# Patient Record
Sex: FEMALE | Race: OTHER | HISPANIC OR LATINO | Employment: FULL TIME | URBAN - METROPOLITAN AREA
[De-identification: names, ages, dates, MRNs, and addresses within clinical notes are randomized per-mention and may not be internally consistent; named-entity substitution may affect disease eponyms.]

---

## 2024-02-24 ENCOUNTER — HOSPITAL ENCOUNTER (OUTPATIENT)
Facility: MEDICAL CENTER | Age: 39
End: 2024-02-24
Payer: COMMERCIAL

## 2024-02-24 ENCOUNTER — OFFICE VISIT (OUTPATIENT)
Dept: URGENT CARE | Facility: CLINIC | Age: 39
End: 2024-02-24
Payer: COMMERCIAL

## 2024-02-24 VITALS
OXYGEN SATURATION: 97 % | SYSTOLIC BLOOD PRESSURE: 110 MMHG | BODY MASS INDEX: 37.08 KG/M2 | DIASTOLIC BLOOD PRESSURE: 72 MMHG | RESPIRATION RATE: 14 BRPM | WEIGHT: 160.2 LBS | HEART RATE: 86 BPM | HEIGHT: 55 IN | TEMPERATURE: 97.7 F

## 2024-02-24 DIAGNOSIS — R30.0 DYSURIA: Primary | ICD-10-CM

## 2024-02-24 DIAGNOSIS — N30.01 ACUTE CYSTITIS WITH HEMATURIA: ICD-10-CM

## 2024-02-24 LAB
APPEARANCE UR: CLEAR
BILIRUB UR STRIP-MCNC: NEGATIVE MG/DL
COLOR UR AUTO: NORMAL
GLUCOSE UR STRIP.AUTO-MCNC: NEGATIVE MG/DL
KETONES UR STRIP.AUTO-MCNC: NEGATIVE MG/DL
LEUKOCYTE ESTERASE UR QL STRIP.AUTO: NORMAL
NITRITE UR QL STRIP.AUTO: NEGATIVE
PH UR STRIP.AUTO: 7 [PH] (ref 5–8)
POCT INT CON NEG: NEGATIVE
POCT INT CON POS: POSITIVE
POCT URINE PREGNANCY TEST: NEGATIVE
PROT UR QL STRIP: NEGATIVE MG/DL
RBC UR QL AUTO: NORMAL
SP GR UR STRIP.AUTO: 1.01
UROBILINOGEN UR STRIP-MCNC: 0.2 MG/DL

## 2024-02-24 PROCEDURE — 87510 GARDNER VAG DNA DIR PROBE: CPT

## 2024-02-24 PROCEDURE — 87660 TRICHOMONAS VAGIN DIR PROBE: CPT

## 2024-02-24 PROCEDURE — 87480 CANDIDA DNA DIR PROBE: CPT

## 2024-02-24 PROCEDURE — 3074F SYST BP LT 130 MM HG: CPT

## 2024-02-24 PROCEDURE — 81002 URINALYSIS NONAUTO W/O SCOPE: CPT

## 2024-02-24 PROCEDURE — 87086 URINE CULTURE/COLONY COUNT: CPT

## 2024-02-24 PROCEDURE — 3078F DIAST BP <80 MM HG: CPT

## 2024-02-24 PROCEDURE — 81025 URINE PREGNANCY TEST: CPT

## 2024-02-24 PROCEDURE — 99213 OFFICE O/P EST LOW 20 MIN: CPT

## 2024-02-24 RX ORDER — NITROFURANTOIN 25; 75 MG/1; MG/1
100 CAPSULE ORAL 2 TIMES DAILY
Qty: 10 CAPSULE | Refills: 0 | Status: SHIPPED | OUTPATIENT
Start: 2024-02-24 | End: 2024-02-29

## 2024-02-24 RX ORDER — PHENAZOPYRIDINE HYDROCHLORIDE 200 MG/1
TABLET, FILM COATED ORAL
Qty: 6 TABLET | Refills: 0 | Status: SHIPPED | OUTPATIENT
Start: 2024-02-24

## 2024-02-24 ASSESSMENT — FIBROSIS 4 INDEX: FIB4 SCORE: 0.69

## 2024-02-24 NOTE — PROGRESS NOTES
Subjective:   Letitia Aaron is a 38 y.o. female who presents for UTI (Cloudy urine, itch, L pelvic pain x Tuesday )          I introduced myself to the patient and informed them that I am a family nurse practitioner.    HPI:Letitia is a 38-year-old female comes in today c/o dysuria, urgency, frequency, cloudy urine, vaginal itching, mild pelvic pain. Onset was 5 days ago.  Patient describes symptoms as constant, worsening. They describe the pain as burning when she passes urine, aching in her lower abdomen. Aggravating factors include none. Relieving factors include none. Treatments tried at home include none. They describe their symptoms as moderate. She denies any vaginal odor or discharge.      Review of Systems   Constitutional:  Negative for chills, fever and malaise/fatigue.   HENT:  Negative for congestion, ear pain and sore throat.    Eyes:  Negative for pain, discharge and redness.   Respiratory:  Negative for cough, sputum production, shortness of breath, wheezing and stridor.    Cardiovascular:  Negative for chest pain and palpitations.   Gastrointestinal:  Positive for abdominal pain. Negative for diarrhea, nausea and vomiting.   Genitourinary:  Positive for dysuria, frequency and urgency. Negative for flank pain and hematuria.   Musculoskeletal:  Negative for myalgias.   Skin:  Negative for rash.   Neurological:  Negative for dizziness and headaches.       Medications: This patient does not have an active medication from one of the medication groupers.     Allergies: Patient has no known allergies.    Problem List: does not have a problem list on file.    Surgical History:  No past surgical history on file.    Past Social Hx:        Past Family Hx:   family history is not on file.     Problem list, medications, and allergies reviewed by myself today in Epic.   I have documented what I find to be significant in regards to past medical, social, family and surgical history  in my HPI or under PMH/PSH/  "review section, otherwise it is noncontributory     Objective:     /72 (BP Location: Left arm, Patient Position: Sitting, BP Cuff Size: Adult)   Pulse 86   Temp 36.5 °C (97.7 °F) (Temporal)   Resp 14   Ht 1.245 m (4' 1\")   Wt 72.7 kg (160 lb 3.2 oz)   SpO2 97%   BMI 46.91 kg/m²     During this visit, appropriate PPE was worn, and hand hygiene was performed.    Physical Exam  Vitals reviewed.   Constitutional:       General: She is not in acute distress.     Appearance: Normal appearance. She is normal weight. She is not ill-appearing or toxic-appearing.   HENT:      Head: Normocephalic and atraumatic.      Right Ear: External ear normal.      Left Ear: External ear normal.      Nose: No congestion or rhinorrhea.   Eyes:      General: No scleral icterus.        Right eye: No discharge.         Left eye: No discharge.      Extraocular Movements: Extraocular movements intact.      Conjunctiva/sclera: Conjunctivae normal.   Cardiovascular:      Rate and Rhythm: Normal rate.   Pulmonary:      Effort: Pulmonary effort is normal.   Abdominal:      General: There is no distension.      Palpations: There is no hepatomegaly, splenomegaly or mass.      Tenderness: There is abdominal tenderness in the suprapubic area. There is no right CVA tenderness, left CVA tenderness, guarding or rebound. Negative signs include Flores's sign, Rovsing's sign and McBurney's sign.      Hernia: No hernia is present.       Genitourinary:     Comments: Deferred  Musculoskeletal:         General: No swelling or tenderness. Normal range of motion.      Right lower leg: No edema.      Left lower leg: No edema.   Skin:     General: Skin is warm and dry.   Neurological:      General: No focal deficit present.      Mental Status: She is alert and oriented to person, place, and time. Mental status is at baseline.   Psychiatric:         Mood and Affect: Mood normal.         Behavior: Behavior normal.         Lab Results/POC Test Results "   Results for orders placed or performed in visit on 02/24/24   POCT Urinalysis   Result Value Ref Range    POC Color light yellow Negative    POC Appearance clear Negative    POC Glucose negative Negative mg/dL    POC Bilirubin negative Negative mg/dL    POC Ketones negative Negative mg/dL    POC Specific Gravity 1.010 <1.005 - >1.030    POC Blood Trace Negative    POC Urine PH 7.0 5.0 - 8.0    POC Protein negative Negative mg/dL    POC Urobiligen 0.2 Negative (0.2) mg/dL    POC Nitrites negative Negative    POC Leukocyte Esterase small Negative   POCT Pregnancy   Result Value Ref Range    POC Urine Pregnancy Test Negative     Internal Control Positive Positive     Internal Control Negative Negative            Assessment/Plan:     Diagnosis and associated orders:     1. Dysuria  POCT Urinalysis    POCT Pregnancy    URINE CULTURE(NEW)    VAGINAL PATHOGENS DNA PANEL    phenazopyridine (PYRIDIUM) 200 MG Tab      2. Acute cystitis with hematuria  nitrofurantoin (MACROBID) 100 MG Cap    phenazopyridine (PYRIDIUM) 200 MG Tab         Comments/MDM:     1. Acute cystitis with hematuria  I discussed with patient that their presentation and symptoms are consistent with UTI.  I discussed diagnosis and possible differentials with the patient, patient was instructed regarding following -  maintain adequate po hydration.  UTI prevention measures including meticulous attention to hygiene, bathe after sexual intercourse, wipe from front to back, wear cotton underwear.  OTC azo prn urinary pain.  I did print out some information on UTI and on Pyridium and Macrobid for the patient and I did go over these with them.  To ER if  any worsening abdominal or flank pain, cliff blood or pus in the urine, fever, chills, nausea and vomiting, lethargy, inability to tolerate fluids, dehydration.     I discussed with patient that I will get a urine culture to guide antibiotic therapy, culture results take about 3 days, I will notify them of the  results via [employer] and we will modify the antibiotic therapy if indicated.  Return to an urgent care or ER in 3-5 days if symptoms persist, sooner if worse.  They state they have good understanding of all instructions and is agreeable to plan of care    - nitrofurantoin (MACROBID) 100 MG Cap; Take 1 Capsule by mouth 2 times a day for 5 days.  Dispense: 10 Capsule; Refill: 0  - phenazopyridine (PYRIDIUM) 200 MG Tab; Take 1 tab by mouth up to three times per day only if needed for bladder or urinary pain.  Will turn urine orange.  Dispense: 6 Tablet; Refill: 0    2. Dysuria  Discussed with patient she does have a trace of blood and some leuks in her urine, no nitrites, we will have her do a vaginal pathogen self swab to rule out other pathogens that could be mimicking symptoms of UTI.  I will let her know of the results via [employer], and treat if indicated.  She states she understands and is agreeable  - POCT Urinalysis  - POCT Pregnancy  - URINE CULTURE(NEW); Future  - VAGINAL PATHOGENS DNA PANEL; Future  - phenazopyridine (PYRIDIUM) 200 MG Tab; Take 1 tab by mouth up to three times per day only if needed for bladder or urinary pain.  Will turn urine orange.  Dispense: 6 Tablet; Refill: 0         Pt is clinically stable at today's acute urgent care visit. Vital signs are normal and reassuring.  No acute distress noted. Appropriate for outpatient management at this time.        I personally reviewed prior external notes and test results pertinent to today's visit.  I have independently reviewed and interpreted all diagnostics ordered during this urgent care acute visit.        Please note that this dictation was created using voice recognition software. I have made a reasonable attempt to correct obvious errors, but I expect that there are errors of grammar and possibly content that I did not discover before finalizing the note.    This note was electronically signed by Brian SAUCEDA, RICHY, RIVERA, ROSEMARY

## 2024-02-25 DIAGNOSIS — R30.0 DYSURIA: ICD-10-CM

## 2024-02-25 LAB
CANDIDA DNA VAG QL PROBE+SIG AMP: NEGATIVE
G VAGINALIS DNA VAG QL PROBE+SIG AMP: NEGATIVE
T VAGINALIS DNA VAG QL PROBE+SIG AMP: NEGATIVE

## 2024-02-25 ASSESSMENT — ENCOUNTER SYMPTOMS
MYALGIAS: 0
FLANK PAIN: 0
EYE DISCHARGE: 0
SPUTUM PRODUCTION: 0
SHORTNESS OF BREATH: 0
FEVER: 0
CHILLS: 0
SORE THROAT: 0
PALPITATIONS: 0
DIARRHEA: 0
DIZZINESS: 0
STRIDOR: 0
NAUSEA: 0
VOMITING: 0
COUGH: 0
EYE PAIN: 0
ABDOMINAL PAIN: 1
EYE REDNESS: 0
WHEEZING: 0
HEADACHES: 0

## 2024-02-27 LAB
BACTERIA UR CULT: NORMAL
SIGNIFICANT IND 70042: NORMAL
SITE SITE: NORMAL
SOURCE SOURCE: NORMAL

## 2024-09-22 ENCOUNTER — OFFICE VISIT (OUTPATIENT)
Dept: URGENT CARE | Facility: CLINIC | Age: 39
End: 2024-09-22

## 2024-09-22 VITALS
DIASTOLIC BLOOD PRESSURE: 78 MMHG | OXYGEN SATURATION: 96 % | BODY MASS INDEX: 29.36 KG/M2 | TEMPERATURE: 97.4 F | WEIGHT: 155.5 LBS | RESPIRATION RATE: 14 BRPM | HEART RATE: 108 BPM | HEIGHT: 61 IN | SYSTOLIC BLOOD PRESSURE: 120 MMHG

## 2024-09-22 DIAGNOSIS — R51.9 ACUTE NONINTRACTABLE HEADACHE, UNSPECIFIED HEADACHE TYPE: ICD-10-CM

## 2024-09-22 DIAGNOSIS — R50.9 FEVER, UNSPECIFIED FEVER CAUSE: ICD-10-CM

## 2024-09-22 PROCEDURE — 3078F DIAST BP <80 MM HG: CPT | Performed by: NURSE PRACTITIONER

## 2024-09-22 PROCEDURE — 99213 OFFICE O/P EST LOW 20 MIN: CPT | Performed by: NURSE PRACTITIONER

## 2024-09-22 PROCEDURE — 3074F SYST BP LT 130 MM HG: CPT | Performed by: NURSE PRACTITIONER

## 2024-09-22 ASSESSMENT — ENCOUNTER SYMPTOMS
COUGH: 0
DIZZINESS: 0
DOUBLE VISION: 0
SORE THROAT: 0
HEADACHES: 1
BLURRED VISION: 0
NECK PAIN: 0
BACK PAIN: 1
ABDOMINAL PAIN: 0
FEVER: 1

## 2024-09-22 ASSESSMENT — FIBROSIS 4 INDEX: FIB4 SCORE: 0.71

## 2024-09-22 NOTE — PATIENT INSTRUCTIONS
Symptomatic care.  -Oral hydration and rest.   -Cough control: nonpharmacologic options for cough relief such as throat lozenges, hot tea, honey.  -Over the counter expectorant as directed; Guaifenesin (Mucinex).  -Tylenol or ibuprofen for pain and fever as directed.   -Warm salt water gargles.  -OTC Throat lozenges or spray (Cepacol).    Seek emergency medical care immediately for: Severe uncontrolled headache, stiff neck, confusion, trouble breathing, inability to wake or stay awake, bluish lips or face, persistent tachycardia (fast heart rate), prolonged dizziness, persistent high grade fevers. Follow up for prolonged cough, persistent wheezing, persistent throat pain, difficulty swallowing, persistent fevers, leg swelling, or any other concerns. Follow up with your Primary Care Provider.

## 2024-09-22 NOTE — PROGRESS NOTES
Subjective:     Letitia Aaron is a 39 y.o. female who presents for Headache (Fever, high temp of 101 x Friday )      Symptoms started on Friday. Had a negative COVID test today. Has had persistent temporal headache, described as pressure. Took Tylenol and motrin. No headache now, stating a cold patch helped. Headache was a gradual onset.     Fever   This is a new problem. The current episode started in the past 7 days. Associated symptoms include headaches. Pertinent negatives include no abdominal pain, coughing, rash, sore throat or urinary pain.       History reviewed. No pertinent past medical history.    History reviewed. No pertinent surgical history.    Social History     Socioeconomic History    Marital status:      Spouse name: Not on file    Number of children: Not on file    Years of education: Not on file    Highest education level: Not on file   Occupational History    Not on file   Tobacco Use    Smoking status: Not on file    Smokeless tobacco: Not on file   Substance and Sexual Activity    Alcohol use: Not on file    Drug use: Not on file    Sexual activity: Not on file   Other Topics Concern    Not on file   Social History Narrative    Not on file     Social Determinants of Health     Financial Resource Strain: Not on file   Food Insecurity: Not on file   Transportation Needs: Not on file   Physical Activity: Not on file   Stress: Not on file   Social Connections: Not on file   Intimate Partner Violence: Not on file   Housing Stability: Not on file        History reviewed. No pertinent family history.     No Known Allergies    Review of Systems   Constitutional:  Positive for fever.   HENT:  Negative for sore throat.    Eyes:  Negative for blurred vision and double vision.   Respiratory:  Negative for cough.    Gastrointestinal:  Negative for abdominal pain.   Genitourinary:  Negative for dysuria, flank pain and hematuria.   Musculoskeletal:  Positive for back pain. Negative for neck  "pain.   Skin:  Negative for rash.   Neurological:  Positive for headaches. Negative for dizziness.   All other systems reviewed and are negative.       Objective:   /78 (BP Location: Left arm, Patient Position: Sitting, BP Cuff Size: Adult long)   Pulse (!) 108   Temp 36.3 °C (97.4 °F) (Temporal)   Resp 14   Ht 1.549 m (5' 1\")   Wt 70.5 kg (155 lb 8 oz)   SpO2 96%   BMI 29.38 kg/m²     Physical Exam  Vitals reviewed.   Constitutional:       General: She is not in acute distress.     Appearance: She is well-developed. She is not toxic-appearing.   HENT:      Head: Normocephalic and atraumatic.      Right Ear: External ear normal.      Left Ear: External ear normal.      Nose: Nose normal.   Eyes:      Extraocular Movements: Extraocular movements intact.      Conjunctiva/sclera: Conjunctivae normal.      Pupils: Pupils are equal, round, and reactive to light.   Neck:      Meningeal: Brudzinski's sign and Kernig's sign absent.   Cardiovascular:      Rate and Rhythm: Normal rate.   Pulmonary:      Effort: Pulmonary effort is normal. No respiratory distress.   Musculoskeletal:         General: Normal range of motion.      Cervical back: Full passive range of motion without pain and neck supple.   Skin:     General: Skin is warm and dry.      Findings: No rash.   Neurological:      Mental Status: She is alert and oriented to person, place, and time.      GCS: GCS eye subscore is 4. GCS verbal subscore is 5. GCS motor subscore is 6.      Cranial Nerves: No facial asymmetry.      Motor: No weakness.      Gait: Gait is intact.   Psychiatric:         Speech: Speech normal.         Behavior: Behavior normal.         Thought Content: Thought content normal.         Judgment: Judgment normal.         Assessment/Plan:   1. Fever, unspecified fever cause    2. Acute nonintractable headache, unspecified headache type    Symptomatic care.  -Oral hydration and rest.   -Cough control: nonpharmacologic options for cough " relief such as throat lozenges, hot tea, honey.  -Over the counter expectorant as directed; Guaifenesin (Mucinex).  -Tylenol or ibuprofen for pain and fever as directed.   -Warm salt water gargles.  -OTC Throat lozenges or spray (Cepacol).    Seek emergency medical care immediately for: Severe uncontrolled headache, stiff neck, confusion, trouble breathing, inability to wake or stay awake, bluish lips or face, persistent tachycardia (fast heart rate), prolonged dizziness, persistent high grade fevers. Follow up for prolonged cough, persistent wheezing, persistent throat pain, difficulty swallowing, persistent fevers, leg swelling, or any other concerns. Follow up with your Primary Care Provider.     -Presents with an acute fever and headache. Denies abdominal pain. No dysuria. Her headache has resolved. Negative Kernig's and Brudzinski's signs on exam. Discussed possible viral correlation, continued monitoring, symptomatic care, and follow up for persistent or worsening symptoms. Stable Vitals.     Differential diagnosis, natural history, supportive care, and indications for immediate follow-up discussed.

## 2024-09-28 PROBLEM — U09.9 COVID-19 LONG HAULER MANIFESTING CHRONIC PALPITATIONS: Status: ACTIVE | Noted: 2023-05-30

## 2024-09-28 PROBLEM — N63.20 MASS OF LEFT BREAST: Status: ACTIVE | Noted: 2023-05-30

## 2024-09-28 PROBLEM — F41.8 DEPRESSION WITH ANXIETY: Status: ACTIVE | Noted: 2023-05-30

## 2024-09-28 PROBLEM — G90.1 DYSAUTONOMIA (HCC): Status: ACTIVE | Noted: 2023-05-30

## 2024-09-28 PROBLEM — R00.2 COVID-19 LONG HAULER MANIFESTING CHRONIC PALPITATIONS: Status: ACTIVE | Noted: 2023-05-30

## 2024-09-28 ASSESSMENT — ENCOUNTER SYMPTOMS: FLANK PAIN: 0
